# Patient Record
Sex: MALE | Race: WHITE
[De-identification: names, ages, dates, MRNs, and addresses within clinical notes are randomized per-mention and may not be internally consistent; named-entity substitution may affect disease eponyms.]

---

## 2020-11-12 ENCOUNTER — HOSPITAL ENCOUNTER (OUTPATIENT)
Dept: HOSPITAL 62 - RAD | Age: 55
End: 2020-11-12
Attending: NURSE PRACTITIONER
Payer: COMMERCIAL

## 2020-11-12 DIAGNOSIS — R31.21: Primary | ICD-10-CM

## 2020-11-12 DIAGNOSIS — K40.90: ICD-10-CM

## 2020-11-12 PROCEDURE — 74176 CT ABD & PELVIS W/O CONTRAST: CPT

## 2020-11-13 NOTE — XMS REPORT
Patient Summary Document

                          Created on:2020



Patient:FEDE ROTHMAN

Sex:Male

:1965

External Reference #:[id number]





Demographics







                          [place] [address]

 

                          Home Phone                [phone]

 

                          Work Phone                [phone]

 

                          Preferred Language        [id number]

 

                          Marital Status            Unknown

 

                          Lutheran Affiliation     Unknown

 

                          Race                      Unknown

 

                          Additional Race(s)        Unavailable

 

                          Ethnic Group              Unknown









Author







                          Organization              [facility]

 

                          [place]                   [facility] [place] [address] [place] [address]

 

                          Phone                     [phone]









Care Team Providers







                    Name                Role                Phone

 

                    Brielle Rivas    Attending Clinician Unavailable









Allergies, Adverse Reactions, Alerts

This patient has no known allergies or adverse reactions.



Medications

This patient has no known medications.



Problems

This patient has no known problems.



Procedures







                Procedure       Date / Time Performed Performing Clinician Devic

e

 

                OFFICE/OUTPATIENT VISIT EST 2020-10-20 10:00:00                 

 

                OFFICE/OUTPATIENT VISIT EST 2020 10:00:00                 







Results







           Test Description Test Time  Test Comments Text Results Atomic Results

 Result 

Comments









                Hemoglobin A1C\S\ 2020-10-20 10:23:00                 









                          Test Item    Value        Reference Range Comments









                HgbA1C, Fingerstick (test code = 4548-4) 10.9 %          4-5.6  

         



URINALYSIS, COMPLETE2020-10-20 00:00:00





                Test Item       Value           Reference Range Comments

 

                SQUAMOUS EPITHELIAL CELLS (test code = NONE SEEN /HPF  < OR = 5 

       



                05042493)                                       

 

                APPEARANCE (test code = 86574572) CLEAR           CLEAR         

  

 

                COLOR (test code = 71856496) YELLOW          YELLOW          

 

                GLUCOSE (test code = 26022747) 3+              NEGATIVE        

 

                HYALINE CAST (test code = 62663339) NONE SEEN /LPF  NONE SEEN   

    

 

                BACTERIA (test code = 21702680) NONE SEEN /HPF  NONE SEEN       

 

                OCCULT BLOOD (test code = 80391804) NEGATIVE        NEGATIVE    

    

 

                LEUKOCYTE ESTERASE (test code = 70927652) NEGATIVE        NEGATI

VE        

 

                KETONES (test code = 03800806) TRACE           NEGATIVE        

 

                SPECIFIC GRAVITY (test code = 72420320) 1.040           1.001-1.

035     

 

                NITRITE (test code = 96427144) NEGATIVE        NEGATIVE        

 

                WBC (test code = 78503001) NONE SEEN /HPF  < OR = 5        

 

                BILIRUBIN (test code = 95966439) NEGATIVE        NEGATIVE       

 

 

                PH (test code = 89953032) 6.0             5.0-8.0         

 

                RBC (test code = 32131404) 0-2 /HPF        < OR = 2        

 

                PROTEIN (test code = 99938-3) 1+              NEGATIVE        



Rapid Strep\S\2019 08:00:00





                Test Item       Value           Reference Range Comments

 

                Rapid Strep (test code = RAPIDSTREP) Positive        N/A        

     



Hemoglobin A1C\S\2019 08:00:00





                Test Item       Value           Reference Range Comments

 

                HgbA1C, Fingerstick (test code = 4548-4) 10.6 %          4-5.6  

         



Hemoglobin A1C\S\2019 15:00:00





                Test Item       Value           Reference Range Comments

 

                HgbA1C, Fingerstick (test code = 4548-4) 10.9 %          4-5.6  

         







Assessments







                Condition Name  Status          Diagnosis Date  Treating Clinici

an

 

                Asymptomatic microscopic hematuria Active                       

   

 

                Type 2 diabetes mellitus with hyperglycemia Active              

            

 

                Other male erectile dysfunction Active                          

 

                Other abnormal glucose Active                          

 

                Other problems related to housing and Active                    

      



                economic circumstances                                 

 

                Encounter for screening for lipoid disorders Active             

             

 

                Other male erectile dysfunction Active                          

 

                Type 2 diabetes mellitus with hyperglycemia Active              

            







Encounters







        Start   End     Encounter Admission Attending Care    Care    Encounter



        Date/Time Date/Time Type    Type    Clinicians Facility Department ID

 

        2020-10-20 2020-10-20 Outpatient         [facility] DB

[id number]



        10:00:00 10:00:00                 Brielle         [facility] 

E-8



                                                        and     [id number]



                                                        Multispecialty [id number]



                                                        Clini   

 

        2020 Outpatient         [facility] 84

[id number]



          10:00:00  10:00:00                      Brielle           Children



                                       [id number]



                                                                      

s                                       [id number]



                                                         and    [id number]



                                                        Multispecialty 



                                                        Clinic, 







Social History

This patient has no known social history.



Vital Signs

This patient has no known vital signs.

## 2021-01-29 ENCOUNTER — HOSPITAL ENCOUNTER (OUTPATIENT)
Dept: HOSPITAL 62 - SP | Age: 56
End: 2021-01-29
Attending: NURSE PRACTITIONER
Payer: COMMERCIAL

## 2021-01-29 DIAGNOSIS — M25.472: Primary | ICD-10-CM

## 2021-01-29 PROCEDURE — 93971 EXTREMITY STUDY: CPT

## 2021-01-29 NOTE — RADIOLOGY REPORT (SQ)
EXAM DESCRIPTION:  VENOUS UNILATERAL LOWER



IMAGES COMPLETED DATE/TIME:  1/29/2021 12:34 pm



REASON FOR STUDY:  LLE SWELLING M25.472  EFFUSION, LEFT ANKLE



COMPARISON:  None.



TECHNIQUE:  Dynamic and static gray scale and color images acquired of the left leg venous system. Se
lected spectral images acquired with additional compression and augmentation maneuvers. The contralat
eral common femoral vein and saphenofemoral junction were also imaged. Images stored on PACS.



LIMITATIONS:  None.



FINDINGS:  COMMON FEMORAL: Normal phasicity, compression and augmentation. No visualized echogenic ma
terial on gray scale. No defects on color images.

FEMORAL: Normal compression and augmentation. No visualized echogenic material on gray scale. No defe
cts on color images.

POPLITEAL: Normal compression, augmentation. No visualized echogenic material on gray scale. No defec
ts on color images.

CALF VESSELS: Normal compression, augmentation. No visualized echogenic material on gray scale. No de
fects on color images.

GSV and SSV: Normal compression, augmentation. No visualized echogenic material on gray scale. No def
ects on color images.

ANY DEEP VENOUS INSUFFICIENCY: Not evaluated.

ANY EVIDENCE OF POPLITEAL CYST: No.

OTHER: No other significant finding.

CONTRALATERAL COMMON FEMORAL VEIN AND SAPHENOFEMORAL JUNCTION:

Normal phasicity, compression and augmentation. No visualized echogenic material on gray scale. No de
fects on color images.



IMPRESSION:  NO EVIDENCE DVT OR SVT IN THE LEFT LEG.



TECHNICAL DOCUMENTATION:  JOB ID:  [id number]

 2011 [employer]- All Rights Reserved



Reading location - IP/workstation name: [email or web address]

## 2021-01-29 NOTE — RADIOLOGY REPORT (SQ)
EXAM DESCRIPTION:  ANKLE LEFT COMPLETE



IMAGES COMPLETED DATE/TIME:  1/29/2021 10:51 am



REASON FOR STUDY:  (M25.472)EFFUSION, LEFT ANKLE M25.472  EFFUSION, LEFT ANKLE



COMPARISON:  None.



NUMBER OF VIEWS:  Three views.



TECHNIQUE:  AP, lateral, and oblique radiographic images acquired of the left ankle.



LIMITATIONS:  None.



FINDINGS:  MINERALIZATION: Normal.

BONES: No acute fracture or dislocation.  No worrisome bone lesions.

JOINTS: Multiple rounded calcific bodies are seen anteromedial to the tibiotalar joint and may be wit
hin the joint capsule.  A joint effusion appears to be present.

SOFT TISSUES: Atherosclerotic vascular calcifications in circumferential edema.

OTHER: No other significant finding.



IMPRESSION:  Multiple rounded calcific bodies seen anteromedial to the tibiotalar joint are nonspecif
ic.  If intraarticular, differential considerations include synovial chondromatosis/ osteochondromato
sis.  If extra-articular these may represent dystrophic calcifications in the setting of previous sof
t tissue injury.  An underlying osseous or soft tissue neoplasm is not excluded.  Recommend further e
valuation with contrast-enhanced MR imaging of the ankle.



TECHNICAL DOCUMENTATION:  JOB ID:  [id number]

 [employer]- All Rights Reserved



Reading location - IP/workstation name: [email or web address]

## 2022-01-27 NOTE — RADIOLOGY REPORT (SQ)
EXAM DESCRIPTION:  CT ABD/PELVIS NO ORAL OR IV



IMAGES COMPLETED DATE/TIME:  11/12/2020 12:49 pm



REASON FOR STUDY:  (R31.21)ASYMPTOMATIC MICROSCOPIC HEMATURIA R31.21  ASYMPTOMATIC MICROSCOPIC HEMATU
CARMELA



COMPARISON:  None.



TECHNIQUE:  CT scan of the abdomen and pelvis performed without intravenous or oral contrast. Images 
reviewed with lung, soft tissue, and bone windows. Reconstructed coronal and sagittal MPR images revi
ewed. All images stored on PACS.

All CT scanners at this facility use dose modulation, iterative reconstruction, and/or weight based d
osing when appropriate to reduce radiation dose to as low as reasonably achievable (ALARA).

[facility]: Dose Right  [facility]: CareDose    [facility]: Dose Right    [facility]: Teradose 4D    [facility]: Smart Technologies



RADIATION DOSE:  CT Rad equipment meets quality standard of care and radiation dose reduction techniq
ues were employed. CTDIvol: 14.8 mGy. DLP: 832 mGy-cm.mGy.



LIMITATIONS:  None.



FINDINGS:  LOWER CHEST: No significant findings. No nodules or infiltrates.

NON-CONTRASTED LIVER, SPLEEN, ADRENALS: Evaluation limited by lack of IV contrast. No identified sign
ificant masses.

PANCREAS: No masses. No peripancreatic inflammatory changes.

GALLBLADDER: No identified stones by CT criteria. No inflammatory changes to suggest cholecystitis.

RIGHT KIDNEY AND URETER: No suspicious masses. Assessment limited by lack of IV contrast.   No signif
icant calcifications.   No hydronephrosis or hydroureter.

LEFT KIDNEY AND URETER: No suspicious masses. Assessment limited by lack of IV contrast.   No signifi
cant calcifications.   No hydronephrosis or hydroureter.

AORTA AND RETROPERITONEUM: There are some small shotty periaortic and mesenteric lymph nodes.  Nonspe
cific.

BOWEL AND PERITONEAL CAVITY: No obvious masses or inflammatory changes. No free fluid.

APPENDIX: Normal.

PELVIS, BLADDER, AND ABDOMINAL WALL:Urinary bladder is not well filled but otherwise unremarkable.  N
o pelvic mass or fluid collection.  Small uncomplicated inguinal hernias.

BONES: No significant findings.

OTHER: No other significant finding.



IMPRESSION:  No acute finding in the abdomen or pelvis.  No urinary pathology is appreciated.  There 
are some small shotty nonspecific periaortic and mesenteric lymph nodes.



COMMENT:  Quality ID # 436: Final reports with documentation of one or more dose reduction techniques
 (e.g., Automated exposure control, adjustment of the mA and/or kV according to patient size, use of 
iterative reconstruction technique)



TECHNICAL DOCUMENTATION:  JOB ID:  [id number]

 2011 [employer]- All Rights Reserved



Reading location - IP/workstation name: [email or web address] no...